# Patient Record
Sex: FEMALE | Race: BLACK OR AFRICAN AMERICAN | NOT HISPANIC OR LATINO | Employment: FULL TIME | URBAN - METROPOLITAN AREA
[De-identification: names, ages, dates, MRNs, and addresses within clinical notes are randomized per-mention and may not be internally consistent; named-entity substitution may affect disease eponyms.]

---

## 2022-12-31 ENCOUNTER — HOSPITAL ENCOUNTER (OUTPATIENT)
Facility: HOSPITAL | Age: 47
Discharge: HOME | End: 2023-01-01
Attending: INTERNAL MEDICINE | Admitting: INTERNAL MEDICINE
Payer: COMMERCIAL

## 2022-12-31 DIAGNOSIS — D50.0 IRON DEFICIENCY ANEMIA DUE TO CHRONIC BLOOD LOSS: ICD-10-CM

## 2022-12-31 DIAGNOSIS — R55 SYNCOPE: ICD-10-CM

## 2022-12-31 DIAGNOSIS — R05.9 COUGH: ICD-10-CM

## 2022-12-31 DIAGNOSIS — D64.9 ANEMIA, UNSPECIFIED TYPE: Primary | ICD-10-CM

## 2022-12-31 LAB
ALBUMIN SERPL-MCNC: 4 G/DL (ref 3.5–5)
ALBUMIN/GLOB SERPL: 1 RATIO (ref 1.1–2)
ALP SERPL-CCNC: 83 UNIT/L (ref 40–150)
ALT SERPL-CCNC: 11 UNIT/L (ref 0–55)
APPEARANCE UR: ABNORMAL
AST SERPL-CCNC: 21 UNIT/L (ref 5–34)
B-HCG UR QL: NEGATIVE
BACTERIA #/AREA URNS AUTO: ABNORMAL /HPF
BASOPHILS # BLD AUTO: 0.02 X10(3)/MCL (ref 0–0.2)
BASOPHILS NFR BLD AUTO: 0.4 %
BILIRUB UR QL STRIP.AUTO: NEGATIVE MG/DL
BILIRUBIN DIRECT+TOT PNL SERPL-MCNC: 0.6 MG/DL
BUN SERPL-MCNC: 11.8 MG/DL (ref 7–18.7)
CALCIUM SERPL-MCNC: 9.2 MG/DL (ref 8.4–10.2)
CHLORIDE SERPL-SCNC: 108 MMOL/L (ref 98–107)
CO2 SERPL-SCNC: 20 MMOL/L (ref 22–29)
COLOR UR AUTO: YELLOW
CREAT SERPL-MCNC: 0.76 MG/DL (ref 0.55–1.02)
CTP QC/QA: YES
EOSINOPHIL # BLD AUTO: 0.21 X10(3)/MCL (ref 0–0.9)
EOSINOPHIL NFR BLD AUTO: 4.6 %
ERYTHROCYTE [DISTWIDTH] IN BLOOD BY AUTOMATED COUNT: 15.6 % (ref 11–14.5)
FLUAV AG UPPER RESP QL IA.RAPID: NOT DETECTED
FLUBV AG UPPER RESP QL IA.RAPID: NOT DETECTED
GFR SERPLBLD CREATININE-BSD FMLA CKD-EPI: >60 MLS/MIN/1.73/M2
GLOBULIN SER-MCNC: 4.1 GM/DL (ref 2.4–3.5)
GLUCOSE SERPL-MCNC: 86 MG/DL (ref 74–100)
GLUCOSE UR QL STRIP.AUTO: NEGATIVE MG/DL
GROUP & RH: NORMAL
HCT VFR BLD AUTO: 24.2 % (ref 37–47)
HGB BLD-MCNC: 7.1 GM/DL (ref 12–16)
IMM GRANULOCYTES # BLD AUTO: 0.01 X10(3)/MCL (ref 0–0.04)
IMM GRANULOCYTES NFR BLD AUTO: 0.2 %
INDIRECT COOMBS GEL: NORMAL
KETONES UR QL STRIP.AUTO: ABNORMAL MG/DL
LEUKOCYTE ESTERASE UR QL STRIP.AUTO: ABNORMAL UNIT/L
LYMPHOCYTES # BLD AUTO: 0.41 X10(3)/MCL (ref 0.6–4.6)
LYMPHOCYTES NFR BLD AUTO: 8.9 %
MCH RBC QN AUTO: 25.9 PG
MCHC RBC AUTO-ENTMCNC: 29.3 MG/DL (ref 33–36)
MCV RBC AUTO: 88.3 FL (ref 80–94)
MONOCYTES # BLD AUTO: 0.29 X10(3)/MCL (ref 0.1–1.3)
MONOCYTES NFR BLD AUTO: 6.3 %
NEUTROPHILS # BLD AUTO: 3.66 X10(3)/MCL (ref 2.1–9.2)
NEUTROPHILS NFR BLD AUTO: 79.6 %
NITRITE UR QL STRIP.AUTO: NEGATIVE
NRBC BLD AUTO-RTO: 0 % (ref 0–1)
PH UR STRIP.AUTO: 5.5 [PH]
PLATELET # BLD AUTO: 160 X10(3)/MCL (ref 140–371)
PMV BLD AUTO: 10.6 FL (ref 9.4–12.4)
POTASSIUM SERPL-SCNC: 4.5 MMOL/L (ref 3.5–5.1)
PROT SERPL-MCNC: 8.1 GM/DL (ref 6.4–8.3)
PROT UR QL STRIP.AUTO: ABNORMAL MG/DL
RBC # BLD AUTO: 2.74 X10(6)/MCL (ref 4.2–5.4)
RBC #/AREA URNS AUTO: <5 /HPF
RBC UR QL AUTO: ABNORMAL UNIT/L
SARS-COV-2 RNA RESP QL NAA+PROBE: NOT DETECTED
SODIUM SERPL-SCNC: 137 MMOL/L (ref 136–145)
SP GR UR STRIP.AUTO: 1.02 (ref 1–1.03)
SQUAMOUS #/AREA URNS AUTO: 16 /HPF
TROPONIN I SERPL-MCNC: <0.01 NG/ML (ref 0–0.04)
UROBILINOGEN UR STRIP-ACNC: 1 MG/DL
WBC # SPEC AUTO: 4.6 X10(3)/MCL (ref 4.5–11.5)
WBC #/AREA URNS AUTO: 29 /HPF

## 2022-12-31 PROCEDURE — 84484 ASSAY OF TROPONIN QUANT: CPT | Performed by: NURSE PRACTITIONER

## 2022-12-31 PROCEDURE — G0378 HOSPITAL OBSERVATION PER HR: HCPCS

## 2022-12-31 PROCEDURE — 93005 ELECTROCARDIOGRAM TRACING: CPT

## 2022-12-31 PROCEDURE — 99285 EMERGENCY DEPT VISIT HI MDM: CPT | Mod: 25

## 2022-12-31 PROCEDURE — 80053 COMPREHEN METABOLIC PANEL: CPT | Performed by: NURSE PRACTITIONER

## 2022-12-31 PROCEDURE — 81001 URINALYSIS AUTO W/SCOPE: CPT | Performed by: NURSE PRACTITIONER

## 2022-12-31 PROCEDURE — 81025 URINE PREGNANCY TEST: CPT | Performed by: NURSE PRACTITIONER

## 2022-12-31 PROCEDURE — 85025 COMPLETE CBC W/AUTO DIFF WBC: CPT | Performed by: NURSE PRACTITIONER

## 2022-12-31 PROCEDURE — 86900 BLOOD TYPING SEROLOGIC ABO: CPT | Performed by: NURSE PRACTITIONER

## 2022-12-31 PROCEDURE — 86923 COMPATIBILITY TEST ELECTRIC: CPT | Mod: 91 | Performed by: NURSE PRACTITIONER

## 2022-12-31 PROCEDURE — 87088 URINE BACTERIA CULTURE: CPT | Performed by: NURSE PRACTITIONER

## 2022-12-31 PROCEDURE — 36430 TRANSFUSION BLD/BLD COMPNT: CPT

## 2022-12-31 PROCEDURE — 0240U COVID/FLU A&B PCR: CPT | Performed by: NURSE PRACTITIONER

## 2022-12-31 PROCEDURE — P9016 RBC LEUKOCYTES REDUCED: HCPCS | Performed by: NURSE PRACTITIONER

## 2022-12-31 PROCEDURE — 83550 IRON BINDING TEST: CPT | Performed by: INTERNAL MEDICINE

## 2022-12-31 RX ORDER — HYDROCODONE BITARTRATE AND ACETAMINOPHEN 500; 5 MG/1; MG/1
TABLET ORAL
Status: DISCONTINUED | OUTPATIENT
Start: 2022-12-31 | End: 2023-01-01 | Stop reason: HOSPADM
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
## 2023-01-01 VITALS
WEIGHT: 125 LBS | RESPIRATION RATE: 14 BRPM | TEMPERATURE: 99 F | OXYGEN SATURATION: 99 % | SYSTOLIC BLOOD PRESSURE: 121 MMHG | DIASTOLIC BLOOD PRESSURE: 70 MMHG | HEART RATE: 67 BPM

## 2023-01-01 PROBLEM — D64.9 ANEMIA: Status: ACTIVE | Noted: 2023-01-01

## 2023-01-01 LAB
ABO + RH BLD: NORMAL
ABO + RH BLD: NORMAL
ALBUMIN SERPL-MCNC: 3.8 G/DL (ref 3.5–5)
ALBUMIN/GLOB SERPL: 1.2 RATIO (ref 1.1–2)
ALP SERPL-CCNC: 87 UNIT/L (ref 40–150)
ALT SERPL-CCNC: 11 UNIT/L (ref 0–55)
AST SERPL-CCNC: 16 UNIT/L (ref 5–34)
AV INDEX (PROSTH): 0.76
AV MEAN GRADIENT: 2 MMHG
AV PEAK GRADIENT: 4 MMHG
AV VALVE AREA: 1.93 CM2
AV VELOCITY RATIO: 0.79
BASOPHILS # BLD AUTO: 0.04 X10(3)/MCL (ref 0–0.2)
BASOPHILS NFR BLD AUTO: 0.6 %
BILIRUBIN DIRECT+TOT PNL SERPL-MCNC: 2.5 MG/DL
BLD PROD TYP BPU: NORMAL
BLD PROD TYP BPU: NORMAL
BLOOD UNIT EXPIRATION DATE: NORMAL
BLOOD UNIT EXPIRATION DATE: NORMAL
BLOOD UNIT TYPE CODE: 600
BLOOD UNIT TYPE CODE: 600
BUN SERPL-MCNC: 16.2 MG/DL (ref 7–18.7)
CALCIUM SERPL-MCNC: 9.2 MG/DL (ref 8.4–10.2)
CHLORIDE SERPL-SCNC: 109 MMOL/L (ref 98–107)
CO2 SERPL-SCNC: 23 MMOL/L (ref 22–29)
CREAT SERPL-MCNC: 0.76 MG/DL (ref 0.55–1.02)
CROSSMATCH INTERPRETATION: NORMAL
CROSSMATCH INTERPRETATION: NORMAL
CV ECHO LV RWT: 0.35 CM
DISPENSE STATUS: NORMAL
DISPENSE STATUS: NORMAL
DOP CALC AO PEAK VEL: 1.02 M/S
DOP CALC AO VTI: 19.8 CM
DOP CALC LVOT AREA: 2.5 CM2
DOP CALC LVOT DIAMETER: 1.8 CM
DOP CALC LVOT PEAK VEL: 0.81 M/S
DOP CALC LVOT STROKE VOLUME: 38.15 CM3
DOP CALC MV VTI: 20.6 CM
DOP CALCLVOT PEAK VEL VTI: 15 CM
E WAVE DECELERATION TIME: 259 MSEC
E/A RATIO: 0.8
E/E' RATIO: 6.67 M/S
ECHO LV POSTERIOR WALL: 0.79 CM (ref 0.6–1.1)
EJECTION FRACTION: 60 %
EOSINOPHIL # BLD AUTO: 0.37 X10(3)/MCL (ref 0–0.9)
EOSINOPHIL NFR BLD AUTO: 5.3 %
ERYTHROCYTE [DISTWIDTH] IN BLOOD BY AUTOMATED COUNT: 15.6 % (ref 11–14.5)
FRACTIONAL SHORTENING: 40 % (ref 28–44)
GFR SERPLBLD CREATININE-BSD FMLA CKD-EPI: >60 MLS/MIN/1.73/M2
GLOBULIN SER-MCNC: 3.3 GM/DL (ref 2.4–3.5)
GLUCOSE SERPL-MCNC: 89 MG/DL (ref 74–100)
HCT VFR BLD AUTO: 43.6 % (ref 37–47)
HGB BLD-MCNC: 13.7 GM/DL (ref 12–16)
IMM GRANULOCYTES # BLD AUTO: 0.01 X10(3)/MCL (ref 0–0.04)
IMM GRANULOCYTES NFR BLD AUTO: 0.1 %
INTERVENTRICULAR SEPTUM: 0.68 CM (ref 0.6–1.1)
IRON SATN MFR SERPL: 11 % (ref 20–50)
IRON SERPL-MCNC: 43 UG/DL (ref 50–170)
LEFT ATRIUM SIZE: 1.8 CM
LEFT ATRIUM VOLUME MOD: 19.5 CM3
LEFT INTERNAL DIMENSION IN SYSTOLE: 2.73 CM (ref 2.1–4)
LEFT VENTRICLE DIASTOLIC VOLUME: 93.9 ML
LEFT VENTRICLE SYSTOLIC VOLUME: 27.8 ML
LEFT VENTRICULAR INTERNAL DIMENSION IN DIASTOLE: 4.53 CM (ref 3.5–6)
LEFT VENTRICULAR MASS: 102.97 G
LV LATERAL E/E' RATIO: 7 M/S
LV SEPTAL E/E' RATIO: 6.36 M/S
LVOT MG: 1 MMHG
LVOT MV: 0.53 CM/S
LYMPHOCYTES # BLD AUTO: 0.68 X10(3)/MCL (ref 0.6–4.6)
LYMPHOCYTES NFR BLD AUTO: 9.7 %
MCH RBC QN AUTO: 26.4 PG
MCHC RBC AUTO-ENTMCNC: 31.4 MG/DL (ref 33–36)
MCV RBC AUTO: 84.2 FL (ref 80–94)
MONOCYTES # BLD AUTO: 0.61 X10(3)/MCL (ref 0.1–1.3)
MONOCYTES NFR BLD AUTO: 8.7 %
MV MEAN GRADIENT: 2 MMHG
MV PEAK A VEL: 0.87 M/S
MV PEAK E VEL: 0.7 M/S
MV PEAK GRADIENT: 4 MMHG
MV STENOSIS PRESSURE HALF TIME: 93 MS
MV VALVE AREA BY CONTINUITY EQUATION: 1.85 CM2
MV VALVE AREA P 1/2 METHOD: 2.37 CM2
NEUTROPHILS # BLD AUTO: 5.3 X10(3)/MCL (ref 2.1–9.2)
NEUTROPHILS NFR BLD AUTO: 75.6 %
NRBC BLD AUTO-RTO: 0 % (ref 0–1)
PISA TR MAX VEL: 2.03 M/S
PLATELET # BLD AUTO: 224 X10(3)/MCL (ref 140–371)
PMV BLD AUTO: 10.7 FL (ref 9.4–12.4)
POTASSIUM SERPL-SCNC: 4.1 MMOL/L (ref 3.5–5.1)
PROT SERPL-MCNC: 7.1 GM/DL (ref 6.4–8.3)
PV PEAK VELOCITY: 0.92 CM/S
RA PRESSURE: 8 MMHG
RBC # BLD AUTO: 5.18 X10(6)/MCL (ref 4.2–5.4)
RIGHT VENTRICULAR END-DIASTOLIC DIMENSION: 1.89 CM
SODIUM SERPL-SCNC: 139 MMOL/L (ref 136–145)
TDI LATERAL: 0.1 M/S
TDI SEPTAL: 0.11 M/S
TDI: 0.11 M/S
TIBC SERPL-MCNC: 338 UG/DL (ref 70–310)
TIBC SERPL-MCNC: 381 UG/DL (ref 250–450)
TR MAX PG: 16 MMHG
TRANSFERRIN SERPL-MCNC: 357 MG/DL (ref 180–382)
TRICUSPID ANNULAR PLANE SYSTOLIC EXCURSION: 2.42 CM
TV REST PULMONARY ARTERY PRESSURE: 24 MMHG
UNIT NUMBER: NORMAL
UNIT NUMBER: NORMAL
WBC # SPEC AUTO: 7 X10(3)/MCL (ref 4.5–11.5)

## 2023-01-01 PROCEDURE — 80053 COMPREHEN METABOLIC PANEL: CPT | Performed by: INTERNAL MEDICINE

## 2023-01-01 PROCEDURE — G0378 HOSPITAL OBSERVATION PER HR: HCPCS

## 2023-01-01 PROCEDURE — P9016 RBC LEUKOCYTES REDUCED: HCPCS | Performed by: NURSE PRACTITIONER

## 2023-01-01 PROCEDURE — 85025 COMPLETE CBC W/AUTO DIFF WBC: CPT | Performed by: INTERNAL MEDICINE

## 2023-01-01 PROCEDURE — 36430 TRANSFUSION BLD/BLD COMPNT: CPT

## 2023-01-01 RX ORDER — FERROUS SULFATE 324(65)MG
324 TABLET, DELAYED RELEASE (ENTERIC COATED) ORAL DAILY
Qty: 30 TABLET | Refills: 1 | Status: SHIPPED | OUTPATIENT
Start: 2023-01-01 | End: 2023-01-31

## 2023-01-01 RX ORDER — SODIUM CHLORIDE 0.9 % (FLUSH) 0.9 %
10 SYRINGE (ML) INJECTION
Status: DISCONTINUED | OUTPATIENT
Start: 2023-01-01 | End: 2023-01-01 | Stop reason: HOSPADM

## 2023-01-01 RX ORDER — TALC
6 POWDER (GRAM) TOPICAL NIGHTLY PRN
Status: DISCONTINUED | OUTPATIENT
Start: 2023-01-01 | End: 2023-01-01 | Stop reason: HOSPADM

## 2023-01-01 NOTE — PROGRESS NOTES
[facility] Hospital Medicine Progress Note        Chief Complaint: Inpatient Follow-up for anemia, syncope    HPI:   47-year-old female with history of Hodgkin's lymphoma status post chemoradiation 2007 in remission, uterine fibroids presents to the ER with weakness and lightheadedness.  He also had a syncopal episode prior to arrival and states she was standing outside in the sun which became lightheaded and did completely lose consciousness but was lowered to the ground by family member.  She denied chest pain or any other symptoms and was back to baseline when she became conscious.      Patient arrived afebrile hemodynamically stable but laboratory work showed a hemoglobin of 7.1.  She explained that she is scheduled for a hysterectomy in June due to ongoing vaginal bleeding.  Hospitalist have consulted for admission for transfusion for symptomatic anemia from menorrhagia.      Interval Hx:   Feels improved, no complaitns    Objective/physical exam:  General: In no acute distress, afebrile  Chest: Clear to auscultation bilaterally  Heart: RRR, +S1, S2, no appreciable murmur  Abdomen: Soft, nontender, BS +  MSK: Warm, no lower extremity edema, no clubbing or cyanosis  Neurologic: Alert and oriented x4, Cranial nerve II-XII intact, Strength 5/5 in all 4 extremities    VITAL SIGNS: 24 HRS MIN & MAX LAST   Temp  Min: 97.8 °F (36.6 °C)  Max: 98.4 °F (36.9 °C) 98.4 °F (36.9 °C)   BP  Min: 99/60  Max: 128/52 (!) 105/54     Pulse  Min: 79  Max: 96  85   Resp  Min: 16  Max: 20 18   SpO2  Min: 97 %  Max: 100 % 100 %       Recent Labs   Lab 12/31/22  1856   WBC 4.6   RBC 2.74*   HGB 7.1*   HCT 24.2*   MCV 88.3   MCH 25.9   MCHC 29.3*   RDW 15.6*      MPV 10.6       Recent Labs   Lab 12/31/22  1720      K 4.5   CO2 20*   BUN 11.8   CREATININE 0.76   CALCIUM 9.2   ALBUMIN 4.0   ALKPHOS 83   ALT 11   AST 21   BILITOT 0.6          Microbiology Results (last 7 days)       Procedure  Component Value Units Date/Time    Urine culture [[id number]] Collected: 12/31/22 1849    Order Status: Completed Specimen: Urine, Clean Catch Updated: 01/01/23 0621     Urine Culture No Growth At 24 Hours             See below for Radiology    Scheduled Med:       Continuous Infusions:       PRN Meds:  sodium chloride, melatonin, sodium chloride 0.9%       Assessment/Plan:  Menorrhagia with symptomatic anemia   Syncope  Uterine fibroids awaiting NICKO      - transfuse 2 units and check iron stores   - telemetry monitoring, echo and carotid ultrasound  - discussed gyn and onc f/u      DVT prophylaxis: scds  Code status: full         Patient condition:  Stable     Anticipated discharge and Disposition:   TBD      All diagnosis and differential diagnosis have been reviewed; assessment and plan has been documented; I have personally reviewed the labs and test results that are presently available; I have reviewed the patients medication list; I have reviewed the consulting providers response and recommendations. I have reviewed or attempted to review medical records based upon their availability    All of the patient's questions have been  addressed and answered. Patient's is agreeable to the above stated plan. I will continue to monitor closely and make adjustments to medical management as needed.  _____________________________________________________________________    Nutrition Status:    Radiology:  US Pelvis Complete Non OB  Narrative: EXAMINATION:  US PELVIS COMPLETE NON OB    CLINICAL HISTORY:  fibroids;    TECHNIQUE:  Transabdominal images of the pelvis were obtained. Images obtained in grayscale and color.    COMPARISON:  07/27/2022.    FINDINGS:  Fibroid uterus is noted with the 2 largest fibroid measuring 3.3 and 3.2 cm.  The uterus measures 9.3 x 5.8 x 7 cm.  The right ovary measures 3 x 1.8 x 2.4 cm.  The left ovary measures 2.7 x 1.6 x 2.8 cm.  No acute ovarian abnormality appreciated.  No free  fluid.  Impression: Fibroid uterus is noted.  Fibroid is adjacent to the endometrium.  Endometrial polyp is not entirely excluded.  With patient's age recommend further evaluation with gyn.    Electronically signed by: Tonny Castellon  Date:    12/31/2022  Time:    22:02  X-Ray Chest PA And Lateral  Narrative: EXAMINATION:  XR CHEST PA AND LATERAL    CLINICAL HISTORY:  Cough, unspecified    TECHNIQUE:  Two-view    COMPARISON:  October 30, 2016.    FINDINGS:  Cardiopericardial silhouette is within normal limits.  Left hilar asymmetric prominence with spiculations is without significant interval difference.  Left lung calcified granuloma.  No acute dense focal or segmental consolidation, congestion, pleural effusion or pneumothorax.  Impression: No acute cardiopulmonary process identified.    Electronically signed by: Juanito Downing  Date:    12/31/2022  Time:    18:35  CT Head Without Contrast  Narrative: EXAMINATION:  CT HEAD WITHOUT CONTRAST    CLINICAL HISTORY:  Syncope, simple, normal neuro exam;    TECHNIQUE:  Sequential axial images were performed of the brain without contrast.    Dose product length of 1924 mGycm. Automated exposure control was utilized to minimize radiation dose.    COMPARISON:  None available.    FINDINGS:  There is no intracranial mass effect, midline shift, hydrocephalus or hemorrhage. There is no sulcal effacement or low attenuation changes to suggest recent large vessel territory infarction.  There is no acute extra axial fluid collection. Visualized paranasal sinuses are clear without mucosal thickening, polypoidal abnormality or air-fluid levels. Mastoid air cells aeration is optimal.  Impression: No acute intracranial findings identified.    Electronically signed by: Juanito Downing  Date:    12/31/2022  Time:    18:22      Roberto Rodriguez MD   01/01/2023

## 2023-01-01 NOTE — DISCHARGE SUMMARY
[facility] Hospital Medicine Discharge Summary    Admit Date: 12/31/2022  Discharge Date and Time: 1/1/20231:16 PM  Admitting Physician:  Team  Discharging Physician: Roberto Rodriguez MD.  Primary Care Physician: Primary Doctor No  Consults: None    Discharge Diagnoses:    Menorrhagia with symptomatic anemia   Syncope  Uterine fibroids awaiting [facility]      Hospital Course    47-year-old female with history of Hodgkin's lymphoma status post chemoradiation 2007 in remission, uterine fibroids presents to the ER with weakness and lightheadedness.  He also had a syncopal episode prior to arrival and states she was standing outside in the sun which became lightheaded and did completely lose consciousness but was lowered to the ground by family member.  She denied chest pain or any other symptoms and was back to baseline when she became conscious.      Patient arrived afebrile hemodynamically stable but laboratory work showed a hemoglobin of 7.1.  She explained that she is scheduled for a hysterectomy in June due to ongoing vaginal bleeding.  Hospitalist have consulted for admission for transfusion for symptomatic anemia from menorrhagia.  Pt was transfuse with improvement of H/H. Pt had carotid U/S with is negative for significant stenosis. Echo has been done with results pending. Echo can be f/u outpt. Pt had DANIEL and will be placed on PO iron. Pt is stable for discharge with f/u outpt.         Pt was seen and examined on the day of discharge  Vitals:  VITAL SIGNS: 24 HRS MIN & MAX LAST   Temp  Min: 97.8 °F (36.6 °C)  Max: 98.6 °F (37 °C) 98.6 °F (37 °C)   BP  Min: 99/60  Max: 128/52 113/65   Pulse  Min: 78  Max: 96  86   Resp  Min: 16  Max: 20 16   SpO2  Min: 97 %  Max: 100 % 99 %       Physical Exam:    General: In no acute distress, afebrile  Chest: Clear to auscultation bilaterally  Heart: RRR, +S1, S2, no appreciable murmur  Abdomen: Soft, nontender, BS +  MSK: Warm, no lower extremity  edema, no clubbing or cyanosis  Neurologic: Alert and oriented x4, Cranial nerve II-XII intact, Strength 5/5 in all 4 extremities      Procedures Performed: No admission procedures for hospital encounter.     Significant Diagnostic Studies: See Full reports for all details    Recent Labs   Lab 12/31/22  1856 01/01/23  0935   WBC 4.6 7.0   RBC 2.74* 5.18   HGB 7.1* 13.7   HCT 24.2* 43.6   MCV 88.3 84.2   MCH 25.9 26.4   MCHC 29.3* 31.4*   RDW 15.6* 15.6*    224   MPV 10.6 10.7       Recent Labs   Lab 12/31/22  1720 01/01/23  0935    139   K 4.5 4.1   CO2 20* 23   BUN 11.8 16.2   CREATININE 0.76 0.76   CALCIUM 9.2 9.2   ALBUMIN 4.0 3.8   ALKPHOS 83 87   ALT 11 11   AST 21 16   BILITOT 0.6 2.5*        Microbiology Results (last 7 days)       Procedure Component Value Units Date/Time    Urine culture [[id number]] Collected: 12/31/22 1849    Order Status: Completed Specimen: Urine, Clean Catch Updated: 01/01/23 0621     Urine Culture No Growth At 24 Hours             US Pelvis Complete Non OB  Narrative: EXAMINATION:  US PELVIS COMPLETE NON OB    CLINICAL HISTORY:  fibroids;    TECHNIQUE:  Transabdominal images of the pelvis were obtained. Images obtained in grayscale and color.    COMPARISON:  07/27/2022.    FINDINGS:  Fibroid uterus is noted with the 2 largest fibroid measuring 3.3 and 3.2 cm.  The uterus measures 9.3 x 5.8 x 7 cm.  The right ovary measures 3 x 1.8 x 2.4 cm.  The left ovary measures 2.7 x 1.6 x 2.8 cm.  No acute ovarian abnormality appreciated.  No free fluid.  Impression: Fibroid uterus is noted.  Fibroid is adjacent to the endometrium.  Endometrial polyp is not entirely excluded.  With patient's age recommend further evaluation with gyn.    Electronically signed by: Tonny Castellon  Date:    12/31/2022  Time:    22:02  X-Ray Chest PA And Lateral  Narrative: EXAMINATION:  XR CHEST PA AND LATERAL    CLINICAL HISTORY:  Cough, unspecified    TECHNIQUE:  Two-view    COMPARISON:  October 30,  2016.    FINDINGS:  Cardiopericardial silhouette is within normal limits.  Left hilar asymmetric prominence with spiculations is without significant interval difference.  Left lung calcified granuloma.  No acute dense focal or segmental consolidation, congestion, pleural effusion or pneumothorax.  Impression: No acute cardiopulmonary process identified.    Electronically signed by: Juanito Downing  Date:    12/31/2022  Time:    18:35  CT Head Without Contrast  Narrative: EXAMINATION:  CT HEAD WITHOUT CONTRAST    CLINICAL HISTORY:  Syncope, simple, normal neuro exam;    TECHNIQUE:  Sequential axial images were performed of the brain without contrast.    Dose product length of 1924 mGycm. Automated exposure control was utilized to minimize radiation dose.    COMPARISON:  None available.    FINDINGS:  There is no intracranial mass effect, midline shift, hydrocephalus or hemorrhage. There is no sulcal effacement or low attenuation changes to suggest recent large vessel territory infarction.  There is no acute extra axial fluid collection. Visualized paranasal sinuses are clear without mucosal thickening, polypoidal abnormality or air-fluid levels. Mastoid air cells aeration is optimal.  Impression: No acute intracranial findings identified.    Electronically signed by: Juanito Downing  Date:    12/31/2022  Time:    18:22         Medication List        START taking these medications      ferrous sulfate 324 mg (65 mg iron) Tbec  Take 1 tablet (324 mg total) by mouth once daily.               Where to Get Your Medications        These medications were sent to [id number] - [address] AT [address] [address]      Phone: [phone]   ferrous sulfate 324 mg (65 mg iron) Tbec          Explained in detail to the patient about the discharge plan, medications, and follow-up visits. Pt understands and agrees with the treatment plan  Discharge  Disposition:     Discharged Condition: stable  Diet-   Dietary Orders (From admission, onward)       Start     Ordered    01/01/23 0041  Diet Adult Regular  (Diet/Nutrition [facility])  Diet effective now         01/01/23 0040                   Medications Per DC med rec  Activities as tolerated   Follow-up Information       Primary Doctor No Follow up in 3 day(s).                           For further questions contact hospitalist office    Discharge time 33 minutes    For worsening symptoms, chest pain, shortness of breath, increased abdominal pain, high grade fever, stroke or stroke like symptoms, immediately go to the nearest Emergency Room or call 911 as soon as possible.      Roberto Nolan M.D, on 1/1/2023. at 1:16 PM.

## 2023-01-01 NOTE — ED NOTES
Dr. Mejia in to see pt. Verbalizes needs repeat blood work and if echo and carotid US is good she can be d;c

## 2023-01-01 NOTE — H&P
[facility] Hospital Medicine History & Physical Examination       Patient Name: Rocky Lizarraga  MRN: [id number]  Patient Class: OP- Observation   Admission Date: 12/31/2022  5:07 PM  Length of Stay: 0  Admitting Service: Hospital Medicine   Attending Physician: Floyd Preston MD   Primary Care Provider: Primary Doctor No  History source: EMR, patient and/or patient's family    CHIEF COMPLAINT   Dizziness/lightheaded    HISTORY OF PRESENT ILLNESS:   47-year-old female with history of Hodgkin's lymphoma status post chemoradiation 2007 in remission, uterine fibroids presents to the ER with weakness and lightheadedness.  He also had a syncopal episode prior to arrival and states she was standing outside in the sun which became lightheaded and did completely lose consciousness but was lowered to the ground by family member.  She denied chest pain or any other symptoms and was back to baseline when she became conscious.     Patient arrived afebrile hemodynamically stable but laboratory work showed a hemoglobin of 7.1.  She explained that she is scheduled for a hysterectomy in June due to ongoing vaginal bleeding.  Hospitalist have consulted for admission for transfusion for symptomatic anemia from menorrhagia.    PAST MEDICAL HISTORY:   Hodgkin's lymphoma status post chemoradiation 2007 in remission   Uterine fibroids    PAST SURGICAL HISTORY:      BILATERAL TUBAL LIGATION      MEDIPORT REMOVAL      PLACEMENT, MEDIPORT      SURGICAL REMOVAL OF LYMPH NODE       ALLERGIES:   Oxycodone-acetaminophen    FAMILY HISTORY:   Reviewed and non-contributory     SOCIAL HISTORY:      Smoking status: Never    Smokeless tobacco: Never    Alcohol use: Not Currently      HOME MEDICATIONS:     Prior to Admission medications    Not on File       REVIEW OF SYSTEMS:   Except as documented, all other systems reviewed and negative     PHYSICAL EXAM:   T 98.2 °F (36.8 °C)   BP (!) 128/52   P 80   RR 16   O2  100 %  GENERAL: awake, alert, oriented and in no acute distress, non-toxic appearing   HEENT: normocephalic atraumatic   NECK: supple   LUNGS: Clear bilaterally, no wheezing or rales, no accessory muscle use   CVS: Regular rate and rhythm, normal peripheral perfusion  ABD: Soft, non-tender, non-distended, bowel sounds present  EXTREMITIES: no clubbing or cyanosis  SKIN: Warm, dry.   NEURO: alert and oriented, grossly without focal deficits   PSYCHIATRIC: Cooperative    LABS AND IMAGING:     Recent Labs     12/31/22  1856   WBC 4.6   RBC 2.74*   HGB 7.1*   HCT 24.2*   MCV 88.3   MCH 25.9   MCHC 29.3*   RDW 15.6*        No results for input(s): LACTIC in the last 72 hours.  No results for input(s): INR, APTT, D-DIMER in the last 72 hours.  No results for input(s): HGBA1C, CHOL, TRIG, LDL, VLDL, HDL in the last 72 hours.   Recent Labs     12/31/22  1720      K 4.5   CHLORIDE 108*   CO2 20*   BUN 11.8   CREATININE 0.76   GLUCOSE 86   CALCIUM 9.2   ALBUMIN 4.0   GLOBULIN 4.1*   ALKPHOS 83   ALT 11   AST 21   BILITOT 0.6     Recent Labs     12/31/22  1720   TROPONINI <0.010          US Pelvis Complete Non OB  Impression: Fibroid uterus is noted.  Fibroid is adjacent to the endometrium.  Endometrial polyp is not entirely excluded.  With patient's age recommend further evaluation with gyn.  Electronically signed by: Tonny Castellon  Date:    12/31/2022  Time:    22:02    X-Ray Chest PA And Lateral  Impression: No acute cardiopulmonary process identified.  Electronically signed by: Juanito Downing  Date:    12/31/2022  Time:    18:35    CT Head Without Contrast  Impression: No acute intracranial findings identified.  Electronically signed by: Juanito Downing  Date:    12/31/2022  Time:    18:22      ASSESSMENT & PLAN:   Menorrhagia with symptomatic anemia   Syncope  Uterine fibroids awaiting NICKO     - transfuse 2 units and check iron stores   - telemetry monitoring, echo and carotid ultrasound  - repeat labs in AM   -  discussed gyn and onc f/u     DVT prophylaxis: scds  Code status: full     If patient was admitted under observational status it is with my approval/permission.     At least 55 min was spent on this history and physical.  Time seen: 11PM   Critical care time = 35 min; Critical care diagnosis = anemia req transfuseion   Floyd Preston MD

## 2023-01-02 LAB — BACTERIA UR CULT: NORMAL

## 2023-01-03 LAB
LEFT CCA DIST DIAS: 29 CM/S
LEFT CCA DIST SYS: 146 CM/S
LEFT CCA PROX DIAS: 17 CM/S
LEFT CCA PROX SYS: 139 CM/S
LEFT ECA DIAS: 0 CM/S
LEFT ECA SYS: 110 CM/S
LEFT ICA DIST DIAS: 22 CM/S
LEFT ICA DIST SYS: 94 CM/S
LEFT ICA MID DIAS: 19 CM/S
LEFT ICA MID SYS: 120 CM/S
LEFT ICA PROX DIAS: 34 CM/S
LEFT ICA PROX SYS: 110 CM/S
LEFT VERTEBRAL DIAS: 9 CM/S
LEFT VERTEBRAL SYS: 79 CM/S
OHS CV CAROTID RIGHT ICA EDV HIGHEST: 29
OHS CV CAROTID ULTRASOUND LEFT ICA/CCA RATIO: 0.82
OHS CV CAROTID ULTRASOUND RIGHT ICA/CCA RATIO: 1.12
OHS CV PV CAROTID LEFT HIGHEST CCA: 146
OHS CV PV CAROTID LEFT HIGHEST ICA: 120
OHS CV PV CAROTID RIGHT HIGHEST CCA: 185
OHS CV PV CAROTID RIGHT HIGHEST ICA: 105
OHS CV US CAROTID LEFT HIGHEST EDV: 34
RIGHT CCA DIST DIAS: 14 CM/S
RIGHT CCA DIST SYS: 94 CM/S
RIGHT CCA PROX DIAS: 10 CM/S
RIGHT CCA PROX SYS: 185 CM/S
RIGHT ECA DIAS: 13 CM/S
RIGHT ECA SYS: 161 CM/S
RIGHT ICA DIST DIAS: 29 CM/S
RIGHT ICA DIST SYS: 84 CM/S
RIGHT ICA MID DIAS: 23 CM/S
RIGHT ICA MID SYS: 105 CM/S
RIGHT ICA PROX DIAS: 14 CM/S
RIGHT ICA PROX SYS: 67 CM/S
RIGHT VERTEBRAL DIAS: 14 CM/S
RIGHT VERTEBRAL SYS: 77 CM/S